# Patient Record
Sex: FEMALE | Race: WHITE | Employment: OTHER | URBAN - METROPOLITAN AREA
[De-identification: names, ages, dates, MRNs, and addresses within clinical notes are randomized per-mention and may not be internally consistent; named-entity substitution may affect disease eponyms.]

---

## 2017-10-06 ENCOUNTER — HOSPITAL ENCOUNTER (OUTPATIENT)
Dept: OTHER | Age: 72
Discharge: HOME | End: 2017-10-06
Attending: ORTHOPAEDIC SURGERY | Admitting: ORTHOPAEDIC SURGERY

## 2017-10-07 LAB
EKG ATRIAL RATE: 66 BPM
EKG DIAGNOSIS: NORMAL
EKG P AXIS: 50 DEGREES
EKG P-R INTERVAL: 118 MS
EKG Q-T INTERVAL: 384 MS
EKG QRS DURATION: 80 MS
EKG QTC CALCULATION (BAZETT): 402 MS
EKG R AXIS: -10 DEGREES
EKG T AXIS: 75 DEGREES
EKG VENTRICULAR RATE: 66 BPM

## 2022-10-24 RX ORDER — AMLODIPINE BESYLATE 2.5 MG/1
2.5 TABLET ORAL DAILY
COMMUNITY

## 2022-10-24 RX ORDER — SIMVASTATIN 40 MG
40 TABLET ORAL NIGHTLY
COMMUNITY

## 2022-10-24 RX ORDER — LEVOTHYROXINE SODIUM 0.05 MG/1
50 TABLET ORAL DAILY
COMMUNITY

## 2022-10-24 NOTE — PROGRESS NOTES
PRE OP INSTRUCTION SHEET   1. Do not eat or drink anything after 12 midnight  prior to surgery. This includes no water, chewing gum or mints. 2. Take the following pills will a small sip of water (see MAR)                                        3. Aspirin, Ibuprofen, Advil, Naproxen, Vitamin E, fish oil and other Anti-inflammatory products should be stopped for 5 days before surgery or as directed by your physician. 4. Check with your Doctor regarding stopping Plavix, Coumadin, Lovenox, Fragmin or other blood thinners   5. Do not smoke, and do not drink any alcoholic beverages 24 hours prior to surgery. This includes NA Beer. 6. You may brush your teeth and gargle the morning of surgery. DO NOT SWALLOW WATER   7. You MUST make arrangements for a responsible adult to take you home after your surgery. You will not be allowed to leave alone or drive yourself home. It is strongly suggested someone stay with you the first 24 hrs. Your surgery will be cancelled if you do not have a ride home. 8. A parent/legal guardian must accompany a child scheduled for surgery and plan to stay at the hospital until the child is discharged. Please do not bring other children with you. 9. Please wear simple, loose fitting clothing to the hospital.  Jaye Ja not bring valuables (money, credit cards, checkbooks, etc.) Do not wear any makeup (including no eye makeup) or nail polish on your fingers or toes. 10. DO NOT wear any jewelry or piercings on day of surgery. All body piercing jewelry must be removed. 11. If you have dentures,glasses, or contacts they will be removed before going to the OR; we will provide you a container. 12. Please see your family doctor/and cardiologist for a history & physical and/or concerning medications. Bring any test results/reports from your physician's office. Have history and labs faxed to [phone].  Remember to bring Blood Bank bracelet on the day of surgery. 14. If you have a Living Will and Durable Power of  for Healthcare, please bring in a copy. 13. Notify your Surgeon if you develop any illness between now and surgery  time, cough, cold, fever, sore throat, nausea, vomiting, etc.  Please notify your surgeon if you experience dizziness, shortness of breath or blurred vision between now & the time of your surgery   16. DO NOT shave your operative site 96 hours prior to surgery. For face & neck surgery, men may use an electric razor 48 hours prior to surgery. 17. Shower with _x__Antibacterial soap (x_chlorhexidine for total joint  Pt's) shower two times before surgery.(the morning of and the night before. 18. To provide excellent care visitors will be limited to one in the room at any given time.   Please call pre admission testing if you any further questions [phone] or [phone]

## 2022-10-28 ENCOUNTER — HOSPITAL ENCOUNTER (OUTPATIENT)
Age: 77
Setting detail: OUTPATIENT SURGERY
Discharge: HOME | End: 2022-10-28
Attending: OPHTHALMOLOGY | Admitting: OPHTHALMOLOGY
Payer: MEDICARE

## 2022-10-28 ENCOUNTER — ANESTHESIA (OUTPATIENT)
Dept: OPERATING ROOM | Age: 77
End: 2022-10-28
Payer: MEDICARE

## 2022-10-28 ENCOUNTER — ANESTHESIA EVENT (OUTPATIENT)
Dept: OPERATING ROOM | Age: 77
End: 2022-10-28
Payer: MEDICARE

## 2022-10-28 VITALS
TEMPERATURE: 98.2 F | WEIGHT: 133 LBS | HEART RATE: 59 BPM | OXYGEN SATURATION: 97 % | RESPIRATION RATE: 16 BRPM | DIASTOLIC BLOOD PRESSURE: 60 MMHG | SYSTOLIC BLOOD PRESSURE: 143 MMHG | HEIGHT: 63 IN | BODY MASS INDEX: 23.57 KG/M2

## 2022-10-28 PROCEDURE — 2580000003 HC RX 258: Performed by: NURSE ANESTHETIST, CERTIFIED REGISTERED

## 2022-10-28 PROCEDURE — 6360000002 HC RX W HCPCS: Performed by: OPHTHALMOLOGY

## 2022-10-28 PROCEDURE — 7100000011 HC PHASE II RECOVERY - ADDTL 15 MIN: Performed by: OPHTHALMOLOGY

## 2022-10-28 PROCEDURE — 3700000000 HC ANESTHESIA ATTENDED CARE: Performed by: OPHTHALMOLOGY

## 2022-10-28 PROCEDURE — 7100000010 HC PHASE II RECOVERY - FIRST 15 MIN: Performed by: OPHTHALMOLOGY

## 2022-10-28 PROCEDURE — 6370000000 HC RX 637 (ALT 250 FOR IP): Performed by: OPHTHALMOLOGY

## 2022-10-28 PROCEDURE — V2632 POST CHMBR INTRAOCULAR LENS: HCPCS | Performed by: OPHTHALMOLOGY

## 2022-10-28 PROCEDURE — 3700000001 HC ADD 15 MINUTES (ANESTHESIA): Performed by: OPHTHALMOLOGY

## 2022-10-28 PROCEDURE — 6360000002 HC RX W HCPCS: Performed by: NURSE ANESTHETIST, CERTIFIED REGISTERED

## 2022-10-28 PROCEDURE — 3600000003 HC SURGERY LEVEL 3 BASE: Performed by: OPHTHALMOLOGY

## 2022-10-28 PROCEDURE — 2500000003 HC RX 250 WO HCPCS: Performed by: OPHTHALMOLOGY

## 2022-10-28 PROCEDURE — 2709999900 HC NON-CHARGEABLE SUPPLY: Performed by: OPHTHALMOLOGY

## 2022-10-28 PROCEDURE — 3600000013 HC SURGERY LEVEL 3 ADDTL 15MIN: Performed by: OPHTHALMOLOGY

## 2022-10-28 PROCEDURE — 6370000000 HC RX 637 (ALT 250 FOR IP)

## 2022-10-28 DEVICE — LENS CLAREON IOL 22.5D: Type: IMPLANTABLE DEVICE | Site: EYE | Status: FUNCTIONAL

## 2022-10-28 RX ORDER — SODIUM CHLORIDE 0.9 % (FLUSH) 0.9 %
5-40 SYRINGE (ML) INJECTION EVERY 12 HOURS SCHEDULED
Status: DISCONTINUED | OUTPATIENT
Start: 2022-10-28 | End: 2022-10-28 | Stop reason: HOSPADM

## 2022-10-28 RX ORDER — PROPOFOL 10 MG/ML
INJECTION, EMULSION INTRAVENOUS PRN
Status: DISCONTINUED | OUTPATIENT
Start: 2022-10-28 | End: 2022-10-28 | Stop reason: SDUPTHER

## 2022-10-28 RX ORDER — SODIUM CHLORIDE 0.9 % (FLUSH) 0.9 %
5-40 SYRINGE (ML) INJECTION PRN
Status: DISCONTINUED | OUTPATIENT
Start: 2022-10-28 | End: 2022-10-28 | Stop reason: HOSPADM

## 2022-10-28 RX ORDER — SODIUM CHLORIDE, SODIUM LACTATE, POTASSIUM CHLORIDE, CALCIUM CHLORIDE 600; 310; 30; 20 MG/100ML; MG/100ML; MG/100ML; MG/100ML
INJECTION, SOLUTION INTRAVENOUS CONTINUOUS PRN
Status: DISCONTINUED | OUTPATIENT
Start: 2022-10-28 | End: 2022-10-28 | Stop reason: SDUPTHER

## 2022-10-28 RX ORDER — TETRACAINE HYDROCHLORIDE 5 MG/ML
SOLUTION OPHTHALMIC PRN
Status: DISCONTINUED | OUTPATIENT
Start: 2022-10-28 | End: 2022-10-28 | Stop reason: ALTCHOICE

## 2022-10-28 RX ORDER — SODIUM CHLORIDE, SODIUM LACTATE, POTASSIUM CHLORIDE, CALCIUM CHLORIDE 600; 310; 30; 20 MG/100ML; MG/100ML; MG/100ML; MG/100ML
INJECTION, SOLUTION INTRAVENOUS CONTINUOUS
Status: DISCONTINUED | OUTPATIENT
Start: 2022-10-28 | End: 2022-10-28 | Stop reason: HOSPADM

## 2022-10-28 RX ORDER — PREDNISOLONE ACETATE 10 MG/ML
1 SUSPENSION/ DROPS OPHTHALMIC CONTINUOUS
Status: DISCONTINUED | OUTPATIENT
Start: 2022-10-28 | End: 2022-10-28 | Stop reason: HOSPADM

## 2022-10-28 RX ORDER — TOBRAMYCIN AND DEXAMETHASONE 3; 1 MG/ML; MG/ML
1 SUSPENSION/ DROPS OPHTHALMIC CONTINUOUS
Status: DISCONTINUED | OUTPATIENT
Start: 2022-10-28 | End: 2022-10-28 | Stop reason: HOSPADM

## 2022-10-28 RX ORDER — TOBRAMYCIN AND DEXAMETHASONE 3; 1 MG/ML; MG/ML
SUSPENSION/ DROPS OPHTHALMIC PRN
Status: DISCONTINUED | OUTPATIENT
Start: 2022-10-28 | End: 2022-10-28 | Stop reason: ALTCHOICE

## 2022-10-28 RX ORDER — SODIUM CHLORIDE 9 MG/ML
INJECTION, SOLUTION INTRAVENOUS PRN
Status: DISCONTINUED | OUTPATIENT
Start: 2022-10-28 | End: 2022-10-28 | Stop reason: HOSPADM

## 2022-10-28 RX ORDER — LIDOCAINE HYDROCHLORIDE 10 MG/ML
0.3 INJECTION, SOLUTION EPIDURAL; INFILTRATION; INTRACAUDAL; PERINEURAL
Status: DISCONTINUED | OUTPATIENT
Start: 2022-10-28 | End: 2022-10-28 | Stop reason: HOSPADM

## 2022-10-28 RX ORDER — SODIUM CHLORIDE, POTASSIUM CHLORIDE, CALCIUM CHLORIDE, MAGNESIUM CHLORIDE, SODIUM ACETATE, AND SODIUM CITRATE 6.4; .75; .48; .3; 3.9; 1.7 MG/ML; MG/ML; MG/ML; MG/ML; MG/ML; MG/ML
SOLUTION IRRIGATION PRN
Status: DISCONTINUED | OUTPATIENT
Start: 2022-10-28 | End: 2022-10-28 | Stop reason: ALTCHOICE

## 2022-10-28 RX ORDER — PREDNISOLONE ACETATE 10 MG/ML
SUSPENSION/ DROPS OPHTHALMIC PRN
Status: DISCONTINUED | OUTPATIENT
Start: 2022-10-28 | End: 2022-10-28 | Stop reason: ALTCHOICE

## 2022-10-28 RX ORDER — PILOCARPINE HYDROCHLORIDE 20 MG/ML
SOLUTION/ DROPS OPHTHALMIC PRN
Status: DISCONTINUED | OUTPATIENT
Start: 2022-10-28 | End: 2022-10-28 | Stop reason: ALTCHOICE

## 2022-10-28 RX ADMIN — SODIUM CHLORIDE, POTASSIUM CHLORIDE, SODIUM LACTATE AND CALCIUM CHLORIDE: 600; 310; 30; 20 INJECTION, SOLUTION INTRAVENOUS at 08:40

## 2022-10-28 RX ADMIN — BROMFENAC SODIUM 1 DROP: 0.7 SOLUTION/ DROPS OPHTHALMIC at 06:54

## 2022-10-28 RX ADMIN — Medication 0.3 ML: at 07:20

## 2022-10-28 RX ADMIN — Medication 0.3 ML: at 06:55

## 2022-10-28 RX ADMIN — PROPOFOL 60 MG: 10 INJECTION, EMULSION INTRAVENOUS at 08:44

## 2022-10-28 RX ADMIN — Medication 0.3 ML: at 07:04

## 2022-10-28 ASSESSMENT — PAIN - FUNCTIONAL ASSESSMENT: PAIN_FUNCTIONAL_ASSESSMENT: NONE - DENIES PAIN

## 2022-10-28 NOTE — OP NOTE
OPERATIVE NOTE    Patient Name   Date of Birth Age  MRN#  Myrtie Nageotte   1945   65 y.o.  [id number]       Surgeon        Surgery Date  Michela Shell MD        10/28/2022       Preoperative Diagnosis: Nuclear Sclerotic Cataract left eye    Postoperative Diagnosis: Nuclear Sclerotic Cataract left eye    Operative Procedure: Phacoemulsification/ Posterior Chamber Intraocular Lens Implantation          Anesthesia:   Peribulbar Block with MAC    Details of Procedure: The patient was brought to the operating room on the operative stretcher in the supine position with the head resting in the head rest.  After appropriate anesthesia monitoring devices were applied, IV sedation was carried out per the anesthesia service. Once sedation was achieved, a peribulbar block was performed, using a 5 mL combination solution containing 4 mL of 2% lidocaine with 1 mL of Vitrase. Approximately 2-3 mL of this solution was administered in a peribulbar fashion through the lower lid of the operative eye. Once appropriate akinesia and anesthesia were demonstrated, the operative eye was prepped and draped in the usual sterile fashion. Tobradex drops and Tetracain drops were administered. A wire lid speculum was then inserted into the operative eye. A paracentesis was then performed using a 15 degree supersharp blade to enter the globe at the limbus, and a .12 mm colibri forceps was used to stabilize the globe. Viscoat was then instilled into the anterior chamber. A temporal clear cornea groove was then created using the 15 degree supersharp blade. The cornea was then entered using the Donal 2.4 mm clearcut keratome blade. The capsulotomy was then performed using a cystotome, and the capsulorrhexis was completed using uttrata forceps. The nucleus of the cataract was then hydrodissected and hydrodelineated using sterile BSS on a 27 gauge cannula.   The nucleus of the cataract was then removed using the phacoemilsification/aspiration device. This was performed in a bimanual/CHOP technique. The remaining cortex was then removed using the irrigation/aspiration device. The posterior capsule was polished using the I/A device with CAP/VAC settings. The capsular bag was reformed using Provisc. The previously selected Donal Clareon +22.5 diopter [id number] intraocular lens implant was then inserted using the cartridge system. It was spun in place using a Kuglen hook. It was centered and found to be in good, stable position. The remaining viscoelastic was then removed using the I/A device. The corneal incision was then hydrated using sterile BSS on a 30 gauge cannula. It was checked and found to be water-tight. Pilocarpine 2%, followed by Tobradex ophthalmic solutions were then instilled into the operative eye. The wire lid speculum was removed, and a postoperative protective shield was applied. The patient was then transferred to the postanesthesia recovery unit in good stable condition. The patient tolerated the procedure well without complications. A postoperative instruction sheet was given, and the patient will follow up with Dr. Martin Scarce office as scheduled.       EBL: none    Specimen: none Operative Note      Patient: Shirley Nicole  YOB: 1945  MRN: [id number]    Date of Procedure: 10/28/2022    Pre-Op Diagnosis: Nuclear sclerotic cataract of left eye [H25.12]    Post-Op Diagnosis: Same       Procedure(s):  PHACO EMULSIFICATION OF CATARACT WITH INTRAOCULAR LENS IMPLANT EYE    Surgeon(s):  Terrence Velazco MD    Assistant:   * No surgical staff found *    Anesthesia: Monitor Anesthesia Care    Estimated Blood Loss (mL): none    Complications: None    Specimens:   * No specimens in log *    Implants:  * No implants in log *      Drains: * No LDAs found *    Findings:     Detailed Description of Procedure:       Electronically signed by Terrence Velazco MD on 10/28/2022 at 8:42 AM

## 2022-10-28 NOTE — H&P
Surgical Service History and Physical    CHIEF COMPLAINT:   Decreased Vision and Glare    Reason for Admission:  Cataract Surgery    History Obtained From:  Patient    HISTORY OF PRESENT ILLNESS:  Pt has noticed painless progressive loss of vision and is experiencing functional difficulty. Past Medical History:        Diagnosis Date    Cancer ([address]     Hyperlipidemia     Hypertension     Thyroid disease        Past Surgical History:        Procedure Laterality Date    ABDOMEN SURGERY      COLONOSCOPY      JOINT REPLACEMENT         Allergies:  Patient has no known allergies. Prior to Admission medications    Medication Sig Start Date End Date Taking? Authorizing Provider   levothyroxine (SYNTHROID) 50 MCG tablet Take 50 mcg by mouth Daily   Yes Historical Provider, MD   amLODIPine (NORVASC) 2.5 MG tablet Take 2.5 mg by mouth daily   Yes Historical Provider, MD   simvastatin (ZOCOR) 40 MG tablet Take 40 mg by mouth nightly   Yes Historical Provider, MD         REVIEW OF SYSTEMS:    CONSTITUTIONAL:  negative  EYES: negative  HEENT:  negative  RESPIRATORY:  negative  CARDIOVASCULAR:  negative  MUSCULOSKELETAL:  negative  NEUROLOGICAL:  negative    PHYSICAL EXAM:    VITALS:  BP (!) 141/67   Pulse 63   Temp 97 °F (36.1 °C) (Temporal)   Resp 16   Ht 5' 3\" (1.6 m)   Wt 133 lb (60.3 kg)   SpO2 97%   BMI 23.56 kg/m²   TEMPERATURE:  Current - Temp: 97 °F (36.1 °C);  Max - Temp  Av °F (36.1 °C)  Min: 97 °F (36.1 °C)  Max: 97 °F (36.1 °C)  RESPIRATIONS RANGE: Resp  Av  Min: 16  Max: 16  PULSE RANGE: Pulse  Av  Min: 63  Max: 63  BLOOD PRESSURE RANGE:  Systolic (99KWE), YEW:426 , Min:141 , BYC:852   ; Diastolic (31GIZ), PNT:24, Min:67, Max:67    CONSTITUTIONAL:  awake, alert, cooperative, no apparent distress, and appears stated age  EYES:  Lids and lashes normal, pupils equal, round and reactive to light, extra ocular muscles intact, sclera clear, conjunctiva normal  ENT:  Normocephalic, without obvious abnormality, atramatic, sinuses nontender on palpation, external ears without lesions, oral pharynx with moist mucus membranes, tonsils without erythema or exudates, gums normal and good dentition. NECK:  Supple, symmetrical, trachea midline, no adenopathy, thyroid symmetric, not enlarged and no tenderness, skin normal  LUNGS:  No increased work of breathing, good air exchange, clear to auscultation bilaterally, no crackles or wheezing  CARDIOVASCULAR:  Normal apical impulse, regular rate and rhythm, normal S1 and S2, no S3 or S4, and no murmur noted  ABDOMEN:  No scars, normal bowel sounds, soft, non-distended, non-tender, no masses palpated, no hepatosplenomegally  MUSCULOSKELETAL:  There is no redness, warmth, or swelling of the joints. Full range of motion noted. Motor strength is 5 out of 5 all extremities bilaterally. Tone is normal.  NEUROLOGIC:  Awake, alert, oriented to name, place and time. Cranial nerves II-XII are grossly intact. Motor is 5 out of 5 bilaterally. Cerebellar finger to nose, heel to shin intact. Sensory is intact.   Babinski down going, Romberg negative, and gait is normal.      Impression: Visually Significant Cataract    Plan: Chris Millard MD

## 2022-10-28 NOTE — ANESTHESIA POSTPROCEDURE EVALUATION
Department of Anesthesiology  Postprocedure Note    Patient: Kenisha Burger  MRN: [id number]  YOB: 1945  Date of evaluation: 10/28/2022      Procedure Summary     Date: 10/28/22 Room / Location: [place] [address] / [facility]    Anesthesia Start: 6229 Anesthesia Stop: 0914    Procedure: PHACO EMULSIFICATION OF CATARACT WITH INTRAOCULAR LENS IMPLANT EYE (Left: Eye) Diagnosis:       Nuclear sclerotic cataract of left eye      (Nuclear sclerotic cataract of left eye [H25.12])    Surgeons: Christian Pascual MD Responsible Provider: Tay Toro MD    Anesthesia Type: MAC ASA Status: 2          Anesthesia Type: No value filed.     David Phase I: David Score: 10    David Phase II:        Anesthesia Post Evaluation    Patient location during evaluation: PACU  Patient participation: complete - patient participated  Level of consciousness: awake and alert  Pain score: 0  Airway patency: patent  Nausea & Vomiting: no nausea and no vomiting  Complications: no  Cardiovascular status: blood pressure returned to baseline  Respiratory status: acceptable  Hydration status: stable

## 2022-10-28 NOTE — PROGRESS NOTES
Discharge instructions given to patient and family. Verbalizes understanding. Pt stable and ready to leave.

## 2022-10-28 NOTE — ANESTHESIA PRE PROCEDURE
Hyperlipidemia     Hypertension     Thyroid disease        Past Surgical History:        Procedure Laterality Date    ABDOMEN SURGERY      COLONOSCOPY      JOINT REPLACEMENT         Social History:    Social History     Tobacco Use    Smoking status: Never    Smokeless tobacco: Never   Substance Use Topics    Alcohol use: Never                                Counseling given: Not Answered      Vital Signs (Current):   Vitals:    10/24/22 1139 10/28/22 0700   BP:  (!) 141/67   Pulse:  63   Resp:  16   Temp:  97 °F (36.1 °C)   TempSrc:  Temporal   SpO2:  97%   Weight: 133 lb (60.3 kg) 133 lb (60.3 kg)   Height: 5' 3\" (1.6 m) 5' 3\" (1.6 m)                                              BP Readings from Last 3 Encounters:   10/28/22 (!) 141/67       NPO Status: Time of last liquid consumption: 2300                        Time of last solid consumption: 2000                        Date of last liquid consumption: 10/27/22                        Date of last solid food consumption: 10/27/22    BMI:   Wt Readings from Last 3 Encounters:   10/28/22 133 lb (60.3 kg)     Body mass index is 23.56 kg/m². CBC: No results found for: WBC, RBC, HGB, HCT, MCV, RDW, PLT    CMP: No results found for: NA, K, CL, CO2, BUN, CREATININE, GFRAA, AGRATIO, LABGLOM, GLUCOSE, GLU, PROT, CALCIUM, BILITOT, ALKPHOS, AST, ALT    POC Tests: No results for input(s): POCGLU, POCNA, POCK, POCCL, POCBUN, POCHEMO, POCHCT in the last 72 hours.     Coags: No results found for: PROTIME, INR, APTT    HCG (If Applicable): No results found for: PREGTESTUR, PREGSERUM, HCG, HCGQUANT     ABGs: No results found for: PHART, PO2ART, GEC5FBF, RFB6WDD, BEART, C7QBBUMF     Type & Screen (If Applicable):  No results found for: LABABO, LABRH    Drug/Infectious Status (If Applicable):  No results found for: HIV, HEPCAB    COVID-19 Screening (If Applicable): No results found for: COVID19        Anesthesia Evaluation  Patient summary reviewed and Nursing notes reviewed  Airway: Mallampati: II  TM distance: >3 FB   Neck ROM: full  Mouth opening: > = 3 FB   Dental: normal exam         Pulmonary:Negative Pulmonary ROS and normal exam  breath sounds clear to auscultation                             Cardiovascular:    (+) hypertension:,         Rhythm: regular  Rate: normal                    Neuro/Psych:   Negative Neuro/Psych ROS              GI/Hepatic/Renal: Neg GI/Hepatic/Renal ROS            Endo/Other: Negative Endo/Other ROS                    Abdominal:             Vascular: negative vascular ROS. Other Findings:           Anesthesia Plan      MAC     ASA 2       Induction: intravenous. Anesthetic plan and risks discussed with patient. Plan discussed with CRNA.                     Babita Joe MD   10/28/2022

## 2022-10-28 NOTE — DISCHARGE INSTRUCTIONS
General Instructions After [place] Gates M.D. Office [phone]  Office  [phone]    1. Tobradex eye drops: one drop every two hours while awake start at ____11am______       Continue one drop every two hours until seen in office at ___145pm today________    2. Bring all eye medications to the office with you today/tomorrow. 3. It is important to remember not to bend your head below your waist (you can squat or bend down on one knee, keeping your head upright.)    4. DO NOT LIFT objects heavier than 10 pounds, which is equal to a gallon of milk. 5. You can wash your face, but avoid the area around your operated eye. We recommend a partial bath today and tomorrow. When you resume normal bathing, be sure to turn your back in the shower avoiding water and shampoo from running into your operated eye. Avoid washing your hair for the first three days. 6. Lie on the un operated side while sleeping, or on your back. 7. PROTECT YOUR OPERATED EYE Wear your shield at bedtime at all times. During the day be sure to wear your glasses (or sunglasses provided) if you leave home. Remember your glasses will make your vision blurry in your operated eye, but will not in anyway damage your eye.    8. NEVER RUB OR PUSH ON YOUR EYE This is the worst thing that you can do while it is healing. 9. It is normal for the white part of the eye to appear red or bloodshot. DO NOT WORRY, this will clear in several weeks. 10. You should have NO PAIN after surgery. The eye may feel slightly irritated at first and (2) Tylenol may be used. If you have actual pain, increasing discomfort or sudden decrease in vision call us immediately. 11. Temporary \"floaters\" are not uncommon immediately after surgery, these will disappear. Your vision should gradually improve. Notify us immediately if you notice a shower of new floaters or flashes develop.   ANESTHESIA DISCHARGE INSTRUCTIONS    You are under the influence of drugs- do not drink alcohol, drive a car, operate machinery(such as power tools, kitchen appliances, etc), sign legal documents, or make any important decisions for 24 hours (or while on pain medications). Children should not ride bikes or [place] or play on gym sets  for 24 hours after surgery. A responsible adult should be with you for 24 hours. Rest at home today- increase activity as tolerated. Progress slowly to a regular diet unless your physician has instructed you otherwise. Drink plenty of water. CALL YOUR DOCTOR IF YOU:  Have moderate to severe nausea or vomiting AND are unable to hold down fluids or prescribed medications. Have bright red bloody drainage from your dressing that won't stop oozing. Do not get relief with your pain medication    NORMAL (POSSIBLE) SIDE EFFECTS FROM ANESTHESIA:     Confusion, temporary memory loss, delayed reaction times in the first 24 hours  Lightheadedness, dizziness, difficulty focusing, blurred vision  Nausea/vomiting can happen  Shivering, feeling cold, sore throat, cough and muscle aches should stop within 24-48 hours  Trouble urinating - call your surgeon if it has been more than 8 hrs  Bruising or soreness at the IV site - call if it remains red, firm or there is drainage             FEMALES OF CHILDBEARING AGE WHO ARE TAKING BIRTH CONTROL PILLS:  You may have received a medication during your procedure that interferes with the   actions of birth control pills (Bridion or Emend). Use some other kind of birth control in addition to your pills, like a condom, for 1 month after your procedure to prevent unwanted pregnancy. The following instructions are to be followed if you have a known history or diagnosis of sleep apnea: For all sleep apnea patients:  ? Sleep on your side or sitting up in a chair whenever possible, especially the first 24 hours after surgery. ? Use only medicines prescribed by your doctor. ? Do not drink alcohol.   ? If you

## 2023-08-12 ENCOUNTER — HOSPITAL ENCOUNTER (OUTPATIENT)
Dept: MRI IMAGING | Age: 78
Discharge: HOME | End: 2023-08-12
Payer: MEDICARE

## 2023-08-12 DIAGNOSIS — I61.9 CEREBRAL HEMORRHAGE (HCC): ICD-10-CM

## 2023-08-12 PROCEDURE — 70553 MRI BRAIN STEM W/O & W/DYE: CPT

## 2023-08-12 PROCEDURE — 6360000004 HC RX CONTRAST MEDICATION: Performed by: STUDENT IN AN ORGANIZED HEALTH CARE EDUCATION/TRAINING PROGRAM

## 2023-08-12 PROCEDURE — A9579 GAD-BASE MR CONTRAST NOS,1ML: HCPCS | Performed by: STUDENT IN AN ORGANIZED HEALTH CARE EDUCATION/TRAINING PROGRAM

## 2023-08-12 RX ADMIN — GADOTERIDOL 12 ML: 279.3 INJECTION, SOLUTION INTRAVENOUS at 14:50

## 2023-08-16 ENCOUNTER — HOSPITAL ENCOUNTER (OUTPATIENT)
Dept: NEUROLOGY | Age: 78
Discharge: HOME | End: 2023-08-16
Payer: MEDICARE

## 2023-08-16 DIAGNOSIS — I61.9 CEREBRAL HEMORRHAGE (HCC): ICD-10-CM

## 2023-08-16 PROCEDURE — 95813 EEG EXTND MNTR 61-119 MIN: CPT | Performed by: PSYCHIATRY & NEUROLOGY

## 2023-08-16 PROCEDURE — 95813 EEG EXTND MNTR 61-119 MIN: CPT

## 2023-08-16 NOTE — PROCEDURES
INTERPRETATION:  This 61-minute, computer-assisted video EEG recording is normal.  No potentially epileptiform activity was present during the recording. CLASSIFICATION:  Normal (awake and drowsy). Sleep - unsuccessful. EKG channel. DESCRIPTION:    BACKGROUND:  The awake recording revealed 9 Hz alpha activity over the posterior head region. Given the extensive study, the patient still did not sleep. The EEG showed normal V waves during drowsiness. There was no significant change on the EEG background with photic stimulation. Hyperventilation was omitted due to recent intracerebral hemorrhage. INTERICTAL DISCHARGES: None    CLINICAL EVENTS:  None    The EKG channel was unremarkable.

## (undated) DEVICE — GAUZE,SPONGE,4"X4",8PLY,STRL,LF,10/TRAY: Brand: MEDLINE

## (undated) DEVICE — SURGICAL PROCEDURE PACK EYE CLERMONT

## (undated) DEVICE — Device

## (undated) DEVICE — PREP SOL PVP IODINE 4%  4 OZ/BTL

## (undated) DEVICE — NEEDLE HYPO 25GA L1.5IN BLU POLYPR HUB S STL REG BVL STR

## (undated) DEVICE — SOLUTION IV IRRIG WATER 1000ML POUR BRL 2F7114

## (undated) DEVICE — 6 ML SYRINGE LUER-LOCK TIP: Brand: MONOJECT

## (undated) DEVICE — CANNULA NSL 13FT TUBE AD ETCO2 DIV SAMP M

## (undated) DEVICE — GLOVE ORANGE PI 8   MSG9080